# Patient Record
Sex: MALE | Race: OTHER | NOT HISPANIC OR LATINO
[De-identification: names, ages, dates, MRNs, and addresses within clinical notes are randomized per-mention and may not be internally consistent; named-entity substitution may affect disease eponyms.]

---

## 2019-07-09 ENCOUNTER — APPOINTMENT (OUTPATIENT)
Dept: ORTHOPEDIC SURGERY | Facility: CLINIC | Age: 29
End: 2019-07-09
Payer: COMMERCIAL

## 2019-07-09 VITALS — SYSTOLIC BLOOD PRESSURE: 131 MMHG | HEART RATE: 90 BPM | DIASTOLIC BLOOD PRESSURE: 86 MMHG

## 2019-07-09 DIAGNOSIS — Z78.9 OTHER SPECIFIED HEALTH STATUS: ICD-10-CM

## 2019-07-09 PROCEDURE — 20610 DRAIN/INJ JOINT/BURSA W/O US: CPT | Mod: LT

## 2019-07-09 PROCEDURE — 99204 OFFICE O/P NEW MOD 45 MIN: CPT | Mod: 25

## 2019-07-09 PROCEDURE — 73562 X-RAY EXAM OF KNEE 3: CPT | Mod: LT

## 2019-07-09 NOTE — HISTORY OF PRESENT ILLNESS
[de-identified] : Patient is here for left knee pain that began on 7/6/19. He states that he dislocated his knee. He was in [place] at the time but was seen at an urgent care on 7/7/19 where he was told that he had fluid in his knee and that he should make an appointment with an outpatient orthopedic to have it drained. There were no images of the knee taken. HIs pain has decreased. He has been using ice, ace wrap, elevation, and NSAID to treat it. He states that he had a similar episode on his right knee recently but the swelling went down after 1 day.

## 2019-07-09 NOTE — RETURN TO WORK/SCHOOL
[FreeTextEntry1] : Alfa is under my care for evaluation and management of left knee injury. We are awaiting MRI results to determine the extent of his injury. He will followup in 1-2 weeks for reassessment and we will determine his ability to return to work at that time.\par Thank you for your understanding.\par \par Sincerely,\par \par Chente Narayan DO, ATC\par Primary Care Sports Medicine\par [facility] [place]\par

## 2019-07-09 NOTE — DISCUSSION/SUMMARY
[de-identified] : Discussed findings of today's exam and possible causes of patient's pain.  Educated patient on their most probable diagnosis of acute left knee pain and subsequent bloody effusion status post twisting mechanism of injury.  Reviewed possible courses of treatment, and we collaboratively decided best course of treatment at this time will include aspiration today (see procedure note).  On x-ray today patient has noted patella rajesh and shallow femoral groove, he likely sustained a patellar subluxation with resultant bloody effusion. However, I still recommend advanced imaging with MRI to evaluate for internal derangement.  Patient will follow up when MRI results are available.  Patient works as a manager at a restaurant, he has been since time of injury with a work note from urgent care. Patient appreciates and agrees with current plan.\par \par This note was generated using dragon medical dictation software.  A reasonable effort has been made for proofreading its contents, but typos may still remain.  If there are any questions or points of clarification needed please notify my office.

## 2019-07-09 NOTE — PROCEDURE
[de-identified] : Aspiration: Left knee joint.\par Indication: Effusion.\par \par A discussion was had with the patient regarding this procedure and all questions were answered. All risks, benefits and alternatives were discussed. These included but were not limited to bleeding, infection, allergic reaction and reaccumulation of fluid. A timeout was done to ensure correct side and pt agreed to the procedure.  Betadine was used to sterilize and prep the area, and alcohol was used to clean the skin in the supero-lateral aspect of the knee. Ethyl chloride spray was then used as a topical anesthetic. An 18-gauge needle was used to aspirate the knee joint from the superolateral approach and approximately 30cc of serosanguineous  fluid was aspirated from the knee without complication. A sterile bandage was then applied. The patient tolerated the procedure well.\par

## 2019-07-09 NOTE — PHYSICAL EXAM
[de-identified] : Constitutional: Well-nourished, well-developed, No acute distress\par Respiratory:  Good respiratory effort, no SOB\par Lymphatic: No regional lymphadenopathy, no lymphedema\par Psychiatric: Pleasant and normal affect, alert and oriented x3\par Skin: Clean dry and intact B/L UE/LE\par Musculoskeletal: normal except where as noted in regional exam\par \par \par Right Knee:\par APPEARANCE: no marked deformities, no swelling or malalignment\par POSITIVE TENDERNESS:  none\par NONTENDER: jt lines b/l & retinacula b/l, patellar & quadriceps tendons, MCL/LCL, ITB at the lateral femoral condyle & Gerdy's tubercle, pes bursa. \par ROM: full & painless. \par RESISTIVE TESTING: painless resisted knee flex/ext. \par SPECIAL TESTS: stable v/v stress. painless grind. neg Lachman's. neg ant/post drawer. neg Diandra's. neg Thessaly test. neg Sarah's & Malacrae's\par NEURO: Normal sensation of LE, DTRs 2+/4 patella and achilles\par PULSES: 2+ DP/PT pulses\par B/L Hips: No asymmetry, malalignment, or swelling, Full ROM, 5/5 strength in flexion/ext, IR/ER, Abd/Add, Joints stable\par B/L Ankles: No asymmetry, malalignment, or swelling, Full ROM, 5/5 strength in DF/PF/Inv/Ev, Joints stable\par \par Left Knee:\par APPEARANCE: 2+ swelling, no marked deformities or malalignment\par POSITIVE TENDERNESS: Medial and lateral joint lines\par NONTENDER: patellar & quadriceps tendons, MCL/LCL, ITB at the lateral femoral condyle & Gerdy's tubercle, pes bursa. \par ROM: full extension, flexion limited to 110° due to swelling and pain. \par RESISTIVE TESTING: Mildly painful resisted knee flex/ext. \par SPECIAL TESTS: stable v/v stress. painless grind. neg Lachman's. neg ant/post drawer. neg Diandra's. neg Thessaly test. neg Sarah's & Malacrae's\par NEURO: Normal sensation of LE, DTRs 2+/4 patella and achilles\par PULSES: 2+ DP/PT pulses\par  [de-identified] : The following radiographs were ordered and read by me during this patient's visit. I reviewed each radiograph in detail with the patient and discussed the findings as highlighted below. \par \par 3 views of the left knee were obtained today that show no fracture, or dislocation. Patient has patella rajesh and shallow femoral groove.  There are no degenerative changes seen. There is no malalignment. No obvious osseous abnormality. Otherwise unremarkable.\par \par

## 2019-07-11 ENCOUNTER — FORM ENCOUNTER (OUTPATIENT)
Age: 29
End: 2019-07-11

## 2019-07-12 ENCOUNTER — OUTPATIENT (OUTPATIENT)
Dept: OUTPATIENT SERVICES | Facility: HOSPITAL | Age: 29
LOS: 1 days | End: 2019-07-12
Payer: COMMERCIAL

## 2019-07-12 ENCOUNTER — APPOINTMENT (OUTPATIENT)
Dept: MRI IMAGING | Facility: CLINIC | Age: 29
End: 2019-07-12
Payer: COMMERCIAL

## 2019-07-12 DIAGNOSIS — M25.462 EFFUSION, LEFT KNEE: ICD-10-CM

## 2019-07-12 PROCEDURE — 73721 MRI JNT OF LWR EXTRE W/O DYE: CPT

## 2019-07-12 PROCEDURE — 73721 MRI JNT OF LWR EXTRE W/O DYE: CPT | Mod: 26,LT

## 2019-07-16 ENCOUNTER — APPOINTMENT (OUTPATIENT)
Dept: ORTHOPEDIC SURGERY | Facility: CLINIC | Age: 29
End: 2019-07-16
Payer: COMMERCIAL

## 2019-07-16 DIAGNOSIS — S83.002D UNSPECIFIED SUBLUXATION OF LEFT PATELLA, SUBSEQUENT ENCOUNTER: ICD-10-CM

## 2019-07-16 DIAGNOSIS — M25.462 EFFUSION, LEFT KNEE: ICD-10-CM

## 2019-07-16 PROCEDURE — 99213 OFFICE O/P EST LOW 20 MIN: CPT

## 2019-07-18 NOTE — PHYSICAL EXAM
[de-identified] : Constitutional: Well-nourished, well-developed, No acute distress\par Respiratory:  Good respiratory effort, no SOB\par Lymphatic: No regional lymphadenopathy, no lymphedema\par Psychiatric: Pleasant and normal affect, alert and oriented x3\par Skin: Clean dry and intact B/L UE/LE\par Musculoskeletal: normal except where as noted in regional exam\par \par Left Knee:\par APPEARANCE: mild swelling, no marked deformities or malalignment\par POSITIVE TENDERNESS: Medial and lateral joint lines\par NONTENDER: patellar & quadriceps tendons, MCL/LCL, ITB at the lateral femoral condyle & Gerdy's tubercle, pes bursa. \par ROM: full extension, flexion limited to 125° due to swelling and pain. \par RESISTIVE TESTING: Mildly painful resisted knee flex/ext. \par SPECIAL TESTS: stable v/v stress. painless grind. neg Lachman's. neg ant/post drawer. neg Diandra's. neg Thessaly test. neg Sarah's & Malacrae's\par NEURO: Normal sensation of LE, DTRs 2+/4 patella and achilles\par PULSES: 2+ DP/PT pulses\par  [de-identified] : [facility]\par EXAM: MR KNEE LT \par PROCEDURE DATE: 07/12/2019 \par \par INTERPRETATION: \par MRI OF THE LEFT KNEE \par \par CLINICAL INDICATION: Recent left knee injury with bloody aspirate. \par TECHNIQUE: Multiplanar, Multisequence MRI was obtained of the left knee. \par \par FINDINGS: \par \par CRUCIATE AND COLLATERAL LIGAMENTS: The anterior cruciate ligament, posterior \par cruciate ligament, medial collateral ligament and lateral collateral \par ligament complex are intact. \par MEDIAL COMPARTMENT: There is no meniscus tear. The hyaline cartilage is \par intact. \par LATERAL COMPARTMENT: At the anterolateral aspect of the central \par weightbearing surface of the lateral femoral condyle there is prominent \par subcortical and subchondral bone marrow edema with mild overlying chondral \par fissuring and cortical irregularity secondary to an osseous contusion likely \par from a transient patellar dislocation. There is no displaced fragment of \par cartilage identified. \par PATELLOFEMORAL COMPARTMENT: Minimal surface chondral fissuring seen at the \par median ridge of the patella. There is no bone marrow edema identified within \par the patella. The trocar sulcus is slightly shallow in appearance. The TT-TG \par distance is normal. There is moderate partial tearing/stripping at the \par origin of the medial retinaculum and MPFL. \par EXTENSOR MECHANISM: Intact \par SYNOVIUM/ JOINT FLUID: There is a moderate to large joint effusion. \par BONE MARROW: Subcortical marrow edema within the anterolateral aspect of the \par distal femur. \par MUSCLES: Normal \par NEUROVASCULAR STRUCTURES: Normal \par SUBCUTANEOUS SOFT TISSUES: Normal \par \par IMPRESSION: Subcortical marrow edema at the anterolateral aspect of the \par distal femur with overlying chondral fissuring and cortical irregularity \par secondary to an osseous contusion. There is no displaced fragment of \par cartilage identified. There is no associated marrow edema within the medial \par aspect of the patella. However, this location is typical of that with a \par transient patellar dislocation. In addition there is moderate partial \par tearing/stripping of the origin of the MPFL and medial retinaculum. The \par trochlear sulcus is mildly shallow in appearance. The TT-TG distance is \par normal. \par \par Moderate to large joint effusion. \par \par \par TIA GRANT M.D., ATTENDING RADIOLOGIST Phone #: [phone] \tdv This document has been electronically signed. Jul 15 2019 12:20PM \par \par \par \par

## 2019-07-18 NOTE — HISTORY OF PRESENT ILLNESS
[de-identified] : Patient is here today to followup on left knee pain. He had the aspiration performed at last visit, there is been a small recurrence of fluid, but not as much as there was previously. He is able to obtain his MRI and here to review the results today. Patient still has stiffness in the knee, but he is ambulating better than he was before the aspiration, and he is wearing a knee brace for support. He has not yet returned to his regular work duties as a .

## 2019-07-18 NOTE — DISCUSSION/SUMMARY
[de-identified] : Patient was seen today for continued management of left knee pain, we reviewed his MRI results which are consistent with a patellar subluxation. There is a sprain of the MPFL, but I do not believe the patient will require any surgical intervention. They're no other evidence of internal derangement. At this time patient only has mild swelling, would recommend deferral of repeat aspiration today.  Patient will be started on a course of physical therapy to restore normal range of motion and strength as tolerated. Patient will continue wearing the brace for support, he may return to his work duties as a  as tolerated. Patient feels like he will be able to return to his work duties that he go slowly and does not overdo it.  Follow up as needed.  Patient appreciates and agrees with current plan.\par \par This note was generated using dragon medical dictation software.  A reasonable effort has been made for proofreading its contents, but typos may still remain.  If there are any questions or points of clarification needed please notify my office.

## 2020-04-25 ENCOUNTER — MESSAGE (OUTPATIENT)
Age: 30
End: 2020-04-25

## 2023-06-19 ENCOUNTER — NON-APPOINTMENT (OUTPATIENT)
Age: 33
End: 2023-06-19

## 2023-06-19 ENCOUNTER — APPOINTMENT (OUTPATIENT)
Dept: INTERNAL MEDICINE | Facility: CLINIC | Age: 33
End: 2023-06-19
Payer: COMMERCIAL

## 2023-06-19 VITALS
DIASTOLIC BLOOD PRESSURE: 80 MMHG | HEIGHT: 72 IN | BODY MASS INDEX: 26.95 KG/M2 | SYSTOLIC BLOOD PRESSURE: 115 MMHG | WEIGHT: 199 LBS | HEART RATE: 90 BPM

## 2023-06-19 DIAGNOSIS — R21 RASH AND OTHER NONSPECIFIC SKIN ERUPTION: ICD-10-CM

## 2023-06-19 PROCEDURE — 36415 COLL VENOUS BLD VENIPUNCTURE: CPT

## 2023-06-19 PROCEDURE — 99385 PREV VISIT NEW AGE 18-39: CPT | Mod: 25

## 2023-06-19 PROCEDURE — 93000 ELECTROCARDIOGRAM COMPLETE: CPT | Mod: 59

## 2023-06-19 RX ORDER — CLOTRIMAZOLE AND BETAMETHASONE DIPROPIONATE 10; .5 MG/G; MG/G
1-0.05 CREAM TOPICAL TWICE DAILY
Qty: 1 | Refills: 0 | Status: ACTIVE | COMMUNITY
Start: 2023-06-19 | End: 1900-01-01

## 2023-06-19 NOTE — HEALTH RISK ASSESSMENT
[Good] : ~his/her~  mood as  good [No falls in past year] : Patient reported no falls in the past year [0] : 2) Feeling down, depressed, or hopeless: Not at all (0) [PHQ-2 Negative - No further assessment needed] : PHQ-2 Negative - No further assessment needed [[id number]] : 0 [Never] : Never

## 2023-06-19 NOTE — ASSESSMENT
[FreeTextEntry1] : 32 year old male presents for initial annual exam.\par \par tinea pedis\par -lotrisone as directed \par \par \par Healthcare Maintenance\par -Advise Yearly Skin cancer screening with Dermatologist \par -Advise Yearly Eye exam with Ophthalmologist\par -Advise Yearly Dental exam\par -Educated of the importance of Healthy diet, such as Mediterranean Diet and Exercise, such as walking >20 minutes a day and increasing gradually as tolerated\par \par Immunizations\par -Flu vaccine \par -Covid vaccine \par \par

## 2023-06-19 NOTE — HISTORY OF PRESENT ILLNESS
[de-identified] : 32 year old male presents for initial annual exam.\par \par Complaining of recurrent left foot itching on the sole.  \par \par diet- home cooking mostly.  not much fruits/vegs.  more carbs and protein \par exercise- 6x week.  weight training, less cardio\par \par Single, not sexually active.  Declined STD screening\par Employed-PT assistant\par Non-smoker\par \par

## 2023-06-20 ENCOUNTER — NON-APPOINTMENT (OUTPATIENT)
Age: 33
End: 2023-06-20

## 2023-06-20 ENCOUNTER — CLINICAL ADVICE (OUTPATIENT)
Age: 33
End: 2023-06-20

## 2023-06-20 LAB
ALBUMIN SERPL ELPH-MCNC: 4.8 G/DL
ALP BLD-CCNC: 80 U/L
ALT SERPL-CCNC: 15 U/L
ANION GAP SERPL CALC-SCNC: 13 MMOL/L
APPEARANCE: CLEAR
AST SERPL-CCNC: 21 U/L
BACTERIA: NEGATIVE /HPF
BILIRUB DIRECT SERPL-MCNC: 0.1 MG/DL
BILIRUB INDIRECT SERPL-MCNC: 0.4 MG/DL
BILIRUB SERPL-MCNC: 0.5 MG/DL
BILIRUBIN URINE: NEGATIVE
BLOOD URINE: NEGATIVE
BUN SERPL-MCNC: 21 MG/DL
CALCIUM SERPL-MCNC: 9.9 MG/DL
CAST: 0 /LPF
CHLORIDE SERPL-SCNC: 104 MMOL/L
CHOLEST SERPL-MCNC: 160 MG/DL
CO2 SERPL-SCNC: 25 MMOL/L
COLOR: YELLOW
CREAT SERPL-MCNC: 1.07 MG/DL
EGFR: 95 ML/MIN/1.73M2
EPITHELIAL CELLS: 0 /HPF
ESTIMATED AVERAGE GLUCOSE: 123 MG/DL
FERRITIN SERPL-MCNC: 136 NG/ML
FOLATE SERPL-MCNC: 9.2 NG/ML
GLUCOSE QUALITATIVE U: NEGATIVE MG/DL
GLUCOSE SERPL-MCNC: 88 MG/DL
HBA1C MFR BLD HPLC: 5.9 %
HCV AB SER QL: NONREACTIVE
HCV S/CO RATIO: 0.14 S/CO
HDLC SERPL-MCNC: 35 MG/DL
HIV1+2 AB SPEC QL IA.RAPID: NONREACTIVE
IRON SATN MFR SERPL: 59 %
IRON SERPL-MCNC: 181 UG/DL
KETONES URINE: NEGATIVE MG/DL
LDLC SERPL CALC-MCNC: 107 MG/DL
LEUKOCYTE ESTERASE URINE: NEGATIVE
MICROSCOPIC-UA: NORMAL
NITRITE URINE: NEGATIVE
NONHDLC SERPL-MCNC: 125 MG/DL
PH URINE: 5.5
POTASSIUM SERPL-SCNC: 4.3 MMOL/L
PROT SERPL-MCNC: 7.7 G/DL
PROTEIN URINE: NORMAL MG/DL
RED BLOOD CELLS URINE: 1 /HPF
SODIUM SERPL-SCNC: 142 MMOL/L
SPECIFIC GRAVITY URINE: 1.03
TIBC SERPL-MCNC: 308 UG/DL
TRIGL SERPL-MCNC: 89 MG/DL
TSH SERPL-ACNC: 1.69 UIU/ML
UIBC SERPL-MCNC: 127 UG/DL
UROBILINOGEN URINE: 0.2 MG/DL
VIT B12 SERPL-MCNC: 472 PG/ML
WHITE BLOOD CELLS URINE: 0 /HPF

## 2023-06-21 ENCOUNTER — CLINICAL ADVICE (OUTPATIENT)
Age: 33
End: 2023-06-21

## 2023-06-21 LAB
M TB IFN-G BLD-IMP: NEGATIVE
QUANTIFERON TB PLUS MITOGEN MINUS NIL: >10 IU/ML
QUANTIFERON TB PLUS NIL: 0.02 IU/ML
QUANTIFERON TB PLUS TB1 MINUS NIL: 0 IU/ML
QUANTIFERON TB PLUS TB2 MINUS NIL: 0.01 IU/ML

## 2024-06-04 ENCOUNTER — APPOINTMENT (OUTPATIENT)
Dept: INTERNAL MEDICINE | Facility: CLINIC | Age: 34
End: 2024-06-04
Payer: COMMERCIAL

## 2024-06-04 ENCOUNTER — NON-APPOINTMENT (OUTPATIENT)
Age: 34
End: 2024-06-04

## 2024-06-04 VITALS
OXYGEN SATURATION: 100 % | DIASTOLIC BLOOD PRESSURE: 82 MMHG | SYSTOLIC BLOOD PRESSURE: 124 MMHG | HEIGHT: 72.17 IN | WEIGHT: 206 LBS | BODY MASS INDEX: 27.9 KG/M2 | HEART RATE: 71 BPM

## 2024-06-04 DIAGNOSIS — R73.03 PREDIABETES.: ICD-10-CM

## 2024-06-04 DIAGNOSIS — Z00.00 ENCOUNTER FOR GENERAL ADULT MEDICAL EXAMINATION W/OUT ABNORMAL FINDINGS: ICD-10-CM

## 2024-06-04 PROCEDURE — 93000 ELECTROCARDIOGRAM COMPLETE: CPT

## 2024-06-04 PROCEDURE — 99395 PREV VISIT EST AGE 18-39: CPT

## 2024-06-04 PROCEDURE — 36415 COLL VENOUS BLD VENIPUNCTURE: CPT

## 2024-06-04 NOTE — HISTORY OF PRESENT ILLNESS
[de-identified] : 32 year old male with h/o Pre-DM presents for annual exam.  overall is doing well.    diet- home cooking mostly.  not much fruits/vegs.  more carbs and protein  exercise- 6x week.  weight training, less cardio  Single, not sexually active.  Declined STD screening Employed-PT assistant Non-smoker

## 2024-06-04 NOTE — HEALTH RISK ASSESSMENT
[Good] : ~his/her~  mood as  good [No] : In the past 12 months have you used drugs other than those required for medical reasons? No [No falls in past year] : Patient reported no falls in the past year [0] : 2) Feeling down, depressed, or hopeless: Not at all (0) [PHQ-2 Negative - No further assessment needed] : PHQ-2 Negative - No further assessment needed [[id number]] : 0 [Never] : Never [Change in mental status noted] : No change in mental status noted [Employed] : employed [Single] : single [Sexually Active] : not sexually active [Fully functional (bathing, dressing, toileting, transferring, walking, feeding)] : Fully functional (bathing, dressing, toileting, transferring, walking, feeding) [Fully functional (using the telephone, shopping, preparing meals, housekeeping, doing laundry, using] : Fully functional and needs no help or supervision to perform IADLs (using the telephone, shopping, preparing meals, housekeeping, doing laundry, using transportation, managing medications and managing finances) [Reports changes in hearing] : Reports no changes in hearing [Reports changes in vision] : Reports no changes in vision

## 2024-06-04 NOTE — ASSESSMENT
[FreeTextEntry1] : //  Pre-DM -Educated of the importance of Healthy diet, such as Mediterranean Diet and Exercise, such as walking >20 minutes a day and increasing gradually as tolerated   Healthcare Maintenance -Advise Yearly Skin cancer screening with Dermatologist  -Advise Yearly Eye exam with Ophthalmologist -Advise Yearly Dental exam -Educated of the importance of Healthy diet, such as Mediterranean Diet and Exercise, such as walking >20 minutes a day and increasing gradually as tolerated -Educated on the importance of limiting alcohol use to less than 5 drinks per week and refraining from tobacco and drug use.  discussed the need to use condoms to decrease risk of ksenia sexually transmitted disease  Immunizations -Flu vaccine  -Covid vaccine

## 2024-06-05 ENCOUNTER — TRANSCRIPTION ENCOUNTER (OUTPATIENT)
Age: 34
End: 2024-06-05

## 2024-06-06 ENCOUNTER — CLINICAL ADVICE (OUTPATIENT)
Age: 34
End: 2024-06-06

## 2024-06-06 DIAGNOSIS — E78.5 HYPERLIPIDEMIA, UNSPECIFIED: ICD-10-CM

## 2024-06-06 DIAGNOSIS — E53.8 DEFICIENCY OF OTHER SPECIFIED B GROUP VITAMINS: ICD-10-CM

## 2024-06-06 LAB
25(OH)D3 SERPL-MCNC: 25.9 NG/ML
ALBUMIN SERPL ELPH-MCNC: 4.5 G/DL
ALP BLD-CCNC: 78 U/L
ALT SERPL-CCNC: 16 U/L
ANION GAP SERPL CALC-SCNC: 12 MMOL/L
APPEARANCE: CLEAR
AST SERPL-CCNC: 21 U/L
BACTERIA: NEGATIVE /HPF
BASOPHILS # BLD AUTO: 0.08 K/UL
BASOPHILS NFR BLD AUTO: 0.8 %
BILIRUB DIRECT SERPL-MCNC: 0.1 MG/DL
BILIRUB INDIRECT SERPL-MCNC: 0.1 MG/DL
BILIRUB SERPL-MCNC: 0.2 MG/DL
BILIRUBIN URINE: NEGATIVE
BLOOD URINE: NEGATIVE
BUN SERPL-MCNC: 21 MG/DL
CALCIUM SERPL-MCNC: 9.6 MG/DL
CAST: 0 /LPF
CHLORIDE SERPL-SCNC: 101 MMOL/L
CHOLEST SERPL-MCNC: 204 MG/DL
CO2 SERPL-SCNC: 26 MMOL/L
COLOR: YELLOW
CREAT SERPL-MCNC: 1.01 MG/DL
EGFR: 101 ML/MIN/1.73M2
EOSINOPHIL # BLD AUTO: 0.54 K/UL
EOSINOPHIL NFR BLD AUTO: 5.6 %
EPITHELIAL CELLS: 0 /HPF
ESTIMATED AVERAGE GLUCOSE: 120 MG/DL
FERRITIN SERPL-MCNC: 175 NG/ML
FOLATE SERPL-MCNC: 10 NG/ML
GLUCOSE QUALITATIVE U: NEGATIVE MG/DL
GLUCOSE SERPL-MCNC: 80 MG/DL
HBA1C MFR BLD HPLC: 5.8 %
HCT VFR BLD CALC: 43 %
HDLC SERPL-MCNC: 36 MG/DL
HGB BLD-MCNC: 13.8 G/DL
IMM GRANULOCYTES NFR BLD AUTO: 0.3 %
IRON SATN MFR SERPL: 29 %
IRON SERPL-MCNC: 87 UG/DL
KETONES URINE: ABNORMAL MG/DL
LDLC SERPL CALC-MCNC: 146 MG/DL
LEUKOCYTE ESTERASE URINE: NEGATIVE
LYMPHOCYTES # BLD AUTO: 3.88 K/UL
LYMPHOCYTES NFR BLD AUTO: 40 %
MAN DIFF?: NORMAL
MCHC RBC-ENTMCNC: 29.7 PG
MCHC RBC-ENTMCNC: 32.1 GM/DL
MCV RBC AUTO: 92.5 FL
MICROSCOPIC-UA: NORMAL
MONOCYTES # BLD AUTO: 0.75 K/UL
MONOCYTES NFR BLD AUTO: 7.7 %
NEUTROPHILS # BLD AUTO: 4.43 K/UL
NEUTROPHILS NFR BLD AUTO: 45.6 %
NITRITE URINE: NEGATIVE
NONHDLC SERPL-MCNC: 167 MG/DL
PH URINE: 5.5
PLATELET # BLD AUTO: 334 K/UL
POTASSIUM SERPL-SCNC: 4.5 MMOL/L
PROT SERPL-MCNC: 7.4 G/DL
PROTEIN URINE: NEGATIVE MG/DL
RBC # BLD: 4.65 M/UL
RBC # FLD: 13.5 %
RED BLOOD CELLS URINE: 1 /HPF
SODIUM SERPL-SCNC: 139 MMOL/L
SPECIFIC GRAVITY URINE: >1.03
TIBC SERPL-MCNC: 305 UG/DL
TRIGL SERPL-MCNC: 116 MG/DL
TSH SERPL-ACNC: 1.6 UIU/ML
UIBC SERPL-MCNC: 218 UG/DL
UROBILINOGEN URINE: 0.2 MG/DL
VIT B12 SERPL-MCNC: 421 PG/ML
WBC # FLD AUTO: 9.71 K/UL
WHITE BLOOD CELLS URINE: 0 /HPF

## 2024-06-19 ENCOUNTER — APPOINTMENT (OUTPATIENT)
Dept: INTERNAL MEDICINE | Facility: CLINIC | Age: 34
End: 2024-06-19